# Patient Record
Sex: FEMALE | Race: AMERICAN INDIAN OR ALASKA NATIVE
[De-identification: names, ages, dates, MRNs, and addresses within clinical notes are randomized per-mention and may not be internally consistent; named-entity substitution may affect disease eponyms.]

---

## 2019-07-26 ENCOUNTER — HOSPITAL ENCOUNTER (OUTPATIENT)
Dept: HOSPITAL 5 - SPVWC | Age: 54
Discharge: HOME | End: 2019-07-26
Attending: OBSTETRICS & GYNECOLOGY
Payer: COMMERCIAL

## 2019-07-26 DIAGNOSIS — J45.909: ICD-10-CM

## 2019-07-26 DIAGNOSIS — I10: ICD-10-CM

## 2019-07-26 DIAGNOSIS — Z12.31: Primary | ICD-10-CM

## 2019-07-26 PROCEDURE — 77067 SCR MAMMO BI INCL CAD: CPT

## 2019-07-29 NOTE — MAMMOGRAPHY REPORT
BILATERAL DIGITAL SCREENING MAMMOGRAMS WITH CAD



INDICATION:  Screening.  



COMPARISONS:  None. However, she indicated that she had had a prior mammogram at [facility].



FINDINGS: Craniocaudal and mediolateral oblique views of both breasts were obtained using 2-D digital
 acquisition.In addition to standard review, the examination was analyzed for possible abnormalities 
using a computer-assisted detection device (iCAD).



There are scattered areas of fibroglandular density.



A left asymmetry on the MLO view requires comparison with the prior mammogram or additional imaging. 
No architectural distortion or suspicious calcifications. The right breast is negative.







IMPRESSION:  

Comparison with the previous mammogram is required. We will attempt to obtain a prior mammogram for c
omparison. If we did not obtain a prior mammogram within 30 days, a revised report will be issued rec
ommending a recall for additional imaging. Please be advised that the patient should not schedule an 
appointment for return until adequate time (at least 2 weeks) has passed breast to obtain the prior m
ammogram.



BI-RADS CATEGORY 0:  INCOMPLETE - NEED ADDITIONAL IMAGING EVALUATION AND/OR PRIOR MAMMOGRAMS FOR COMP
ARISON







Information is entered into a reminder system for a target due date for the next mammogram. The resul
ts and recommendations were sent to the patient by mail.



Signer Name: Lopez Aguero MD 

Signed: 7/29/2019 10:17 AM

 Workstation Name: [id number]

## 2019-09-11 ENCOUNTER — HOSPITAL ENCOUNTER (OUTPATIENT)
Dept: HOSPITAL 5 - SPVWC | Age: 54
Discharge: HOME | End: 2019-09-11
Attending: OBSTETRICS & GYNECOLOGY
Payer: COMMERCIAL

## 2019-09-11 DIAGNOSIS — R92.8: Primary | ICD-10-CM

## 2019-09-11 NOTE — MAMMOGRAPHY REPORT
DIGITAL DIAGNOSTIC MAMMOGRAM WITH CAD,  --  9/11/2019



INDICATION: Recall for asymmetry.



TECHNIQUE:  Digital left mammographic imaging was performed.

This examination was interpreted with the benefit of Computer-aided Detection analysis. 



COMPARISON: 7/26/2019



FINDINGS: 



Breast Density: There are scattered areas of fibroglandular density.



There is no evidence of dominant mass, suspicious calcifications or architectural distortion in the l
eft breast. Lateral and spot compression MLO views demonstrate satisfactory effacement of asymmetry.



IMPRESSION: No mammographic evidence of malignancy.



Follow up recommendation: Routine yearly



BI-RADS Category 1:  Negative.



A "normal" or negative report should not discourage follow up or biopsy of a clinically significant f
inding.



A written summary of these findings will be mailed to the patient. The patient will be entered into a
 mammography reporting system which will generate a reminder letter for the patient's next appointmen
t at the appropriate interval.



According to the [employer], yearly mammograms are recommended starting at age 40 
and continuing as long as a woman is in good health.  Breast MRI is recommended for women with an vicky
roximately 20-25% or greater lifetime risk of breast cancer, including women with a strong family his
tory of breast or ovarian cancer and women who have been treated for Hodgkin's disease.



Signer Name: Lopez Aguero MD 

Signed: 9/11/2019 9:06 AM

 Workstation Name: [id number]